# Patient Record
Sex: MALE | Race: OTHER | Employment: FULL TIME | URBAN - METROPOLITAN AREA
[De-identification: names, ages, dates, MRNs, and addresses within clinical notes are randomized per-mention and may not be internally consistent; named-entity substitution may affect disease eponyms.]

---

## 2022-08-29 ENCOUNTER — HOSPITAL ENCOUNTER (EMERGENCY)
Age: 27
Discharge: HOME | End: 2022-08-30
Attending: EMERGENCY MEDICINE

## 2022-08-29 DIAGNOSIS — J06.9 VIRAL URI: ICD-10-CM

## 2022-08-29 DIAGNOSIS — R51.9 NONINTRACTABLE HEADACHE, UNSPECIFIED CHRONICITY PATTERN, UNSPECIFIED HEADACHE TYPE: ICD-10-CM

## 2022-08-29 DIAGNOSIS — R55 NEAR SYNCOPE: Primary | ICD-10-CM

## 2022-08-29 LAB
A/G RATIO: 1.9 (ref 1.1–2.2)
ALBUMIN SERPL-MCNC: 4.6 G/DL (ref 3.4–5)
ALP BLD-CCNC: 103 U/L (ref 40–129)
ALT SERPL-CCNC: 18 U/L (ref 10–40)
ANION GAP SERPL CALCULATED.3IONS-SCNC: 7 MMOL/L (ref 3–16)
AST SERPL-CCNC: 22 U/L (ref 15–37)
BASOPHILS ABSOLUTE: 0 K/UL (ref 0–0.2)
BASOPHILS RELATIVE PERCENT: 0.5 %
BILIRUB SERPL-MCNC: 0.6 MG/DL (ref 0–1)
BUN BLDV-MCNC: 13 MG/DL (ref 7–20)
CALCIUM SERPL-MCNC: 9.5 MG/DL (ref 8.3–10.6)
CHLORIDE BLD-SCNC: 101 MMOL/L (ref 99–110)
CO2: 28 MMOL/L (ref 21–32)
CREAT SERPL-MCNC: 0.7 MG/DL (ref 0.9–1.3)
EOSINOPHILS ABSOLUTE: 0.1 K/UL (ref 0–0.6)
EOSINOPHILS RELATIVE PERCENT: 2.2 %
GFR AFRICAN AMERICAN: >60
GFR NON-AFRICAN AMERICAN: >60
GLUCOSE BLD-MCNC: 105 MG/DL (ref 70–99)
HCT VFR BLD CALC: 40.9 % (ref 40.5–52.5)
HEMOGLOBIN: 13.9 G/DL (ref 13.5–17.5)
LYMPHOCYTES ABSOLUTE: 2.2 K/UL (ref 1–5.1)
LYMPHOCYTES RELATIVE PERCENT: 38.8 %
MCH RBC QN AUTO: 31.2 PG (ref 26–34)
MCHC RBC AUTO-ENTMCNC: 33.9 G/DL (ref 31–36)
MCV RBC AUTO: 91.9 FL (ref 80–100)
MONOCYTES ABSOLUTE: 0.3 K/UL (ref 0–1.3)
MONOCYTES RELATIVE PERCENT: 5.7 %
NEUTROPHILS ABSOLUTE: 3 K/UL (ref 1.7–7.7)
NEUTROPHILS RELATIVE PERCENT: 52.8 %
PDW BLD-RTO: 13.3 % (ref 12.4–15.4)
PLATELET # BLD: 235 K/UL (ref 135–450)
PMV BLD AUTO: 7.5 FL (ref 5–10.5)
POTASSIUM REFLEX MAGNESIUM: 4.2 MMOL/L (ref 3.5–5.1)
RBC # BLD: 4.45 M/UL (ref 4.2–5.9)
SARS-COV-2, NAAT: NOT DETECTED
SODIUM BLD-SCNC: 136 MMOL/L (ref 136–145)
TOTAL PROTEIN: 7 G/DL (ref 6.4–8.2)
WBC # BLD: 5.7 K/UL (ref 4–11)

## 2022-08-29 PROCEDURE — 99284 EMERGENCY DEPT VISIT MOD MDM: CPT

## 2022-08-29 PROCEDURE — 93005 ELECTROCARDIOGRAM TRACING: CPT | Performed by: EMERGENCY MEDICINE

## 2022-08-29 PROCEDURE — 6370000000 HC RX 637 (ALT 250 FOR IP): Performed by: EMERGENCY MEDICINE

## 2022-08-29 PROCEDURE — 87635 SARS-COV-2 COVID-19 AMP PRB: CPT

## 2022-08-29 PROCEDURE — 84484 ASSAY OF TROPONIN QUANT: CPT

## 2022-08-29 PROCEDURE — 80053 COMPREHEN METABOLIC PANEL: CPT

## 2022-08-29 PROCEDURE — 85025 COMPLETE CBC W/AUTO DIFF WBC: CPT

## 2022-08-29 RX ORDER — ACETAMINOPHEN 325 MG/1
650 TABLET ORAL ONCE
Status: COMPLETED | OUTPATIENT
Start: 2022-08-29 | End: 2022-08-29

## 2022-08-29 RX ORDER — CETIRIZINE HYDROCHLORIDE 10 MG/1
10 TABLET ORAL DAILY
Qty: 30 TABLET | Refills: 0 | Status: SHIPPED | OUTPATIENT
Start: 2022-08-29 | End: 2022-09-28

## 2022-08-29 RX ORDER — NAPROXEN 500 MG/1
500 TABLET ORAL 2 TIMES DAILY WITH MEALS
Qty: 20 TABLET | Refills: 0 | Status: SHIPPED | OUTPATIENT
Start: 2022-08-29 | End: 2022-09-08

## 2022-08-29 RX ORDER — FLUTICASONE PROPIONATE 50 MCG
1 SPRAY, SUSPENSION (ML) NASAL DAILY
Qty: 16 G | Refills: 0 | Status: SHIPPED | OUTPATIENT
Start: 2022-08-29

## 2022-08-29 RX ADMIN — ACETAMINOPHEN 650 MG: 325 TABLET, FILM COATED ORAL at 21:22

## 2022-08-29 ASSESSMENT — PAIN DESCRIPTION - ORIENTATION
ORIENTATION: OTHER (COMMENT)
ORIENTATION: RIGHT;LEFT

## 2022-08-29 ASSESSMENT — PAIN DESCRIPTION - LOCATION
LOCATION: HEAD
LOCATION: HEAD

## 2022-08-29 ASSESSMENT — PAIN DESCRIPTION - DESCRIPTORS
DESCRIPTORS: OTHER (COMMENT)
DESCRIPTORS: DISCOMFORT

## 2022-08-29 ASSESSMENT — PAIN SCALES - GENERAL
PAINLEVEL_OUTOF10: 2
PAINLEVEL_OUTOF10: 4

## 2022-08-29 ASSESSMENT — PAIN DESCRIPTION - FREQUENCY: FREQUENCY: CONTINUOUS

## 2022-08-30 VITALS
TEMPERATURE: 97.5 F | WEIGHT: 122.14 LBS | OXYGEN SATURATION: 100 % | RESPIRATION RATE: 16 BRPM | SYSTOLIC BLOOD PRESSURE: 96 MMHG | HEART RATE: 51 BPM | DIASTOLIC BLOOD PRESSURE: 52 MMHG

## 2022-08-30 LAB
EKG ATRIAL RATE: 53 BPM
EKG DIAGNOSIS: NORMAL
EKG P AXIS: 76 DEGREES
EKG P-R INTERVAL: 156 MS
EKG Q-T INTERVAL: 382 MS
EKG QRS DURATION: 94 MS
EKG QTC CALCULATION (BAZETT): 358 MS
EKG R AXIS: 57 DEGREES
EKG T AXIS: 27 DEGREES
EKG VENTRICULAR RATE: 53 BPM
TROPONIN: <0.01 NG/ML

## 2022-08-30 PROCEDURE — 93010 ELECTROCARDIOGRAM REPORT: CPT | Performed by: INTERNAL MEDICINE

## 2022-08-30 ASSESSMENT — PAIN SCALES - GENERAL: PAINLEVEL_OUTOF10: 2

## 2022-08-30 NOTE — ED TRIAGE NOTES
Patient to the ER with complaints of dizziness , weakness, and headache that started today while he was at work. Patient reports noise sensitivity making his headache worse. Patient reports a discomfort in his temples bilaterally but denies any other pain. He is also asking if his throat can be checked. He reports difficulty swallowing and itching x1 week. Patient is [place] speaking. Triage completed via [employer] video remote interpreting. Patient's sister is present and speaks [place]. Patient confirms that he would like for his sister to be present to interpret for him the remainder of the visit.

## 2022-08-30 NOTE — ED NOTES
Patient discharged home in stable condition. All discharge instructions and prescriptions reviewed via  pad by this RN. Patient verbalizes understanding. Ambulatory and a/o x4 at time of discharge.       Hayde Lacy RN  08/30/22 0107

## 2022-08-30 NOTE — ED PROVIDER NOTES
EMERGENCY DEPARTMENT PROVIDER NOTE    Patient Identification  Pt Name: Loco Serrano  MRN: [id number]  [place] 1995  Date of evaluation: 8/29/2022  Provider: Anthony Mock DO  PCP: No primary care provider on file. Chief Complaint  Dizziness and Headache      HPI  (History provided by patient)  This is a 32 y.o. male otherwise healthy who was brought in by family for generalized weakness and dizziness. Patient felt today that he was lightheaded and about to pass out, although he denies any loss of consciousness. Nothing seemed to make the symptoms any better or worse. Associated with tingling sensation in his bilateral feet and hands. Patient also reports mild bifrontal aching headache. Denies worst headache of life or thunderclap onset. States he has had about a week of cough and sinus congestion preceding this. Denies any fevers, chills, vision changes, neck stiffness, chest pain or trouble breathing. Of note, patient is primarily Citizen of the [place]-speaking, history and exam obtained with aid of  #[id number]    ROS    Const:  No fevers, no chills, +generalized weakness  Skin:  No rash, no lesions  Eyes:  No visual changes, no blurry or double vision, no pain  ENT:  +sore throat, no difficulty swallowing, no ear pain, +sinus congestion  Card:  No chest pain, no palpitations, no edema  Resp:  No shortness of breath, +cough, no wheezing  Abd:  No abdominal pain, no nausea, no vomiting, no diarrhea  Genitourinary:  No dysuria, no hematuria  MSK:  No joint pain, +myalgia  Neuro:  No focal weakness, +headache, +paresthesia    All other systems reviewed and negative unless otherwise noted in HPI      I have reviewed the following nursing documentation:  Allergies: Patient has no known allergies. Past medical history: History reviewed. No pertinent past medical history. Past surgical history: History reviewed. No pertinent surgical history.     Home medications:   Discharge Medication List as of 8/30/2022 12:35 AM          Social history:  reports that he has quit smoking. His smoking use included cigarettes. He has never used smokeless tobacco. He reports that he does not drink alcohol and does not use drugs. Family history:  History reviewed. No pertinent family history. Exam  ED Triage Vitals [08/29/22 2030]   BP Temp Temp Source Heart Rate Resp SpO2 Height Weight   118/75 97.5 °F (36.4 °C) Oral 61 16 -- -- 122 lb 2.2 oz (55.4 kg)     Nursing note and vitals reviewed. Constitutional: Well developed, well nourished. Non-toxic in appearance. HENT:      Head: Normocephalic and atraumatic. Ears: External ears normal.  Bilateral tympanic membrane's intact, nonerythematous, nonbulging. Nose: Nose normal.     Mouth: Membrane mucosa moist and pink. Posterior oropharynx mildly injected, no edema or exudate. Uvula midline. Eyes: Anicteric sclera. No discharge. Bilateral sclera mildly injected, no discharge. PERRL, no nystagmus, normal test of skew  Neck: Supple. Trachea midline. No meningismus. No cervical lymphadenopathy. Cardiovascular: Bradycardia, regular rhythm; no murmurs, rubs, or gallops. Pulmonary/Chest: Effort normal. No respiratory distress. CTAB. No stridor. No wheezes. No rales. Abdominal: Soft. No distension. Nontender to deep palpation all quadrants. Musculoskeletal: Moves all extremities. No gross deformity. Neurological: Alert and orientedx4. Face symmetric. Speech is clear. CN 2-12 intact. 5/5 motor and sensation grossly intact all extremities. No pronator drift. Normal finger to nose, normal heel to shin. Normal gait observed. Skin: Warm and dry. No rash. Psychiatric: Normal mood and affect.  Behavior is normal.    Procedures      EKG    EKG was reviewed by emergency department physician in the absence of a cardiologist    Narrow complex sinus rhythm, rate 53, normal axis, normal PA and QRS intervals, normal Qtc, no ST elevations or depressions, normal t-wave morphology, impression sinus bradycardia, no STEMI, no comparison available      Radiology  No orders to display       Labs  Results for orders placed or performed during the hospital encounter of 08/29/22   COVID-19, Rapid    Specimen: Nasopharyngeal Swab   Result Value Ref Range    SARS-CoV-2, NAAT Not Detected Not Detected   CBC with Auto Differential   Result Value Ref Range    WBC 5.7 4.0 - 11.0 K/uL    RBC 4.45 4.20 - 5.90 M/uL    Hemoglobin 13.9 13.5 - 17.5 g/dL    Hematocrit 40.9 40.5 - 52.5 %    MCV 91.9 80.0 - 100.0 fL    MCH 31.2 26.0 - 34.0 pg    MCHC 33.9 31.0 - 36.0 g/dL    RDW 13.3 12.4 - 15.4 %    Platelets 335 410 - 262 K/uL    MPV 7.5 5.0 - 10.5 fL    Neutrophils % 52.8 %    Lymphocytes % 38.8 %    Monocytes % 5.7 %    Eosinophils % 2.2 %    Basophils % 0.5 %    Neutrophils Absolute 3.0 1.7 - 7.7 K/uL    Lymphocytes Absolute 2.2 1.0 - 5.1 K/uL    Monocytes Absolute 0.3 0.0 - 1.3 K/uL    Eosinophils Absolute 0.1 0.0 - 0.6 K/uL    Basophils Absolute 0.0 0.0 - 0.2 K/uL   Comprehensive Metabolic Panel w/ Reflex to MG   Result Value Ref Range    Sodium 136 136 - 145 mmol/L    Potassium reflex Magnesium 4.2 3.5 - 5.1 mmol/L    Chloride 101 99 - 110 mmol/L    CO2 28 21 - 32 mmol/L    Anion Gap 7 3 - 16    Glucose 105 (H) 70 - 99 mg/dL    BUN 13 7 - 20 mg/dL    Creatinine 0.7 (L) 0.9 - 1.3 mg/dL    GFR Non-African American >60 >60    GFR African American >60 >60    Calcium 9.5 8.3 - 10.6 mg/dL    Total Protein 7.0 6.4 - 8.2 g/dL    Albumin 4.6 3.4 - 5.0 g/dL    Albumin/Globulin Ratio 1.9 1.1 - 2.2    Total Bilirubin 0.6 0.0 - 1.0 mg/dL    Alkaline Phosphatase 103 40 - 129 U/L    ALT 18 10 - 40 U/L    AST 22 15 - 37 U/L   Troponin   Result Value Ref Range    Troponin <0.01 <0.01 ng/mL   EKG 12 Lead   Result Value Ref Range    Ventricular Rate 53 BPM    Atrial Rate 53 BPM    P-R Interval 156 ms    QRS Duration 94 ms    Q-T Interval 382 ms    QTc Calculation (Bazett) 358 ms    P Axis 76 degrees    R Axis 57 degrees    T Axis 27 degrees    Diagnosis       Sinus bradycardiaOtherwise normal ECGNo previous ECGs available       Screenings   [place] Coma Scale  Eye Opening: Spontaneous  Best Verbal Response: Oriented  Best Motor Response: Obeys commands  Jeremy Coma Scale Score: 15           MDM and ED Course    Patient afebrile and nontoxic. No distress. No hypoxia increased work of breathing. No hypoglycemia. Neuro exam is nonfocal, nothing to suggest CVA/TIA patient with no cardiovascular risk factors. EKG without evidence of acute ischemia, troponin normal, nothing to suggest ACS. No malignant dysrhythmia. Laboratory Is reassuring, no evidence of endorgan dysfunction or clinically significant electrolyte derangement. Presentation is most consistent with viral illness. COVID-19 is negative. Headache mild, no red flags for SAH/ICH. No anticipated benefit for emergent CT imaging of the head at this time. He is low risk by [facility] syncope score. Patient felt improved with Tylenol. On my reevaluation he was sleeping and in no distress. Patient felt safe for discharge to self-care with close PCP follow-up. He is agreeable with plan and feels comfortable returning to home. Strict return precautions were discussed. I Dr. Amanuel May am the primary clinician of record. Disposition instructions and reevaluation performed with aid of  #[id number]    Final Impression  1. Near syncope    2. Nonintractable headache, unspecified chronicity pattern, unspecified headache type    3. Viral URI        Blood pressure (!) 96/52, pulse 51, temperature 97.5 °F (36.4 °C), temperature source Oral, resp. rate 16, weight 122 lb 2.2 oz (55.4 kg), SpO2 100 %.      Disposition:  DISPOSITION Decision To Discharge 08/30/2022 12:10:39 AM      Patient Referrals:  Lalo Cardoso  [phone]        Discharge Medications:  Discharge Medication List as of 8/30/2022 12:35 AM        START taking these medications    Details   naproxen (NAPROSYN) 500 MG tablet Take 1 tablet by mouth 2 times daily (with meals) for 10 days, Disp-20 tablet, R-0Print      cetirizine (ZYRTEC) 10 MG tablet Take 1 tablet by mouth daily, Disp-30 tablet, R-0Print      fluticasone (FLONASE) 50 MCG/ACT nasal spray 1 spray by Each Nostril route daily, Disp-16 g, R-0Print             Discontinued Medications:  Discharge Medication List as of 8/30/2022 12:35 AM          This chart was generated using the [address] [place] [address] Stamptation system. I created this record but it may contain dictation errors given the limitations of this technology.     Niki Smith DO (electronically signed)  Attending Emergency Physician       Niki Smith DO  08/30/22 6868